# Patient Record
Sex: FEMALE | Race: WHITE | NOT HISPANIC OR LATINO | Employment: FULL TIME | URBAN - METROPOLITAN AREA
[De-identification: names, ages, dates, MRNs, and addresses within clinical notes are randomized per-mention and may not be internally consistent; named-entity substitution may affect disease eponyms.]

---

## 2024-06-17 ENCOUNTER — OFFICE VISIT (OUTPATIENT)
Dept: PODIATRY | Facility: CLINIC | Age: 64
End: 2024-06-17
Payer: MEDICARE

## 2024-06-17 ENCOUNTER — LAB VISIT (OUTPATIENT)
Dept: LAB | Facility: HOSPITAL | Age: 64
End: 2024-06-17
Attending: PODIATRIST
Payer: MEDICARE

## 2024-06-17 VITALS — HEIGHT: 65 IN | BODY MASS INDEX: 33.86 KG/M2 | WEIGHT: 203.25 LBS

## 2024-06-17 DIAGNOSIS — M10.9 GOUT INVOLVING TOE OF RIGHT FOOT, UNSPECIFIED CAUSE, UNSPECIFIED CHRONICITY: ICD-10-CM

## 2024-06-17 DIAGNOSIS — M10.9 GOUT INVOLVING TOE OF RIGHT FOOT, UNSPECIFIED CAUSE, UNSPECIFIED CHRONICITY: Primary | ICD-10-CM

## 2024-06-17 LAB — URATE SERPL-MCNC: 7.1 MG/DL (ref 2.4–5.7)

## 2024-06-17 PROCEDURE — 1160F RVW MEDS BY RX/DR IN RCRD: CPT | Mod: CPTII,S$GLB,, | Performed by: PODIATRIST

## 2024-06-17 PROCEDURE — 3008F BODY MASS INDEX DOCD: CPT | Mod: CPTII,S$GLB,, | Performed by: PODIATRIST

## 2024-06-17 PROCEDURE — 84550 ASSAY OF BLOOD/URIC ACID: CPT | Performed by: PODIATRIST

## 2024-06-17 PROCEDURE — 99203 OFFICE O/P NEW LOW 30 MIN: CPT | Mod: S$GLB,,, | Performed by: PODIATRIST

## 2024-06-17 PROCEDURE — 1159F MED LIST DOCD IN RCRD: CPT | Mod: CPTII,S$GLB,, | Performed by: PODIATRIST

## 2024-06-17 PROCEDURE — 3288F FALL RISK ASSESSMENT DOCD: CPT | Mod: CPTII,S$GLB,, | Performed by: PODIATRIST

## 2024-06-17 PROCEDURE — 36415 COLL VENOUS BLD VENIPUNCTURE: CPT | Performed by: PODIATRIST

## 2024-06-17 PROCEDURE — 99999 PR PBB SHADOW E&M-NEW PATIENT-LVL III: CPT | Mod: PBBFAC,,, | Performed by: PODIATRIST

## 2024-06-17 PROCEDURE — 1101F PT FALLS ASSESS-DOCD LE1/YR: CPT | Mod: CPTII,S$GLB,, | Performed by: PODIATRIST

## 2024-06-17 RX ORDER — COLCHICINE 0.6 MG/1
0.6 TABLET ORAL DAILY
Qty: 6 TABLET | Refills: 1 | Status: SHIPPED | OUTPATIENT
Start: 2024-06-17 | End: 2024-06-19 | Stop reason: SDUPTHER

## 2024-06-17 RX ORDER — LEVOTHYROXINE SODIUM 50 UG/1
50 TABLET ORAL
COMMUNITY
Start: 2024-03-05

## 2024-06-17 RX ORDER — ROSUVASTATIN CALCIUM 5 MG/1
TABLET, COATED ORAL
COMMUNITY
Start: 2024-06-13

## 2024-06-17 RX ORDER — INDOMETHACIN 75 MG/1
75 CAPSULE, EXTENDED RELEASE ORAL 2 TIMES DAILY WITH MEALS
Qty: 60 CAPSULE | Refills: 0 | Status: SHIPPED | OUTPATIENT
Start: 2024-06-17

## 2024-06-17 RX ORDER — ALLOPURINOL 300 MG/1
300 TABLET ORAL
COMMUNITY
Start: 2024-05-07

## 2024-06-17 RX ORDER — METFORMIN HYDROCHLORIDE 1000 MG/1
TABLET ORAL
COMMUNITY
Start: 2024-06-13

## 2024-06-17 RX ORDER — ZOLPIDEM TARTRATE 10 MG/1
10 TABLET ORAL DAILY PRN
COMMUNITY
Start: 2024-05-08

## 2024-06-17 NOTE — PROGRESS NOTES
"Subjective:     Patient ID: Ileana Coko is a 64 y.o. female.    Chief Complaint: Gout (Pt c/o right foot gout, pt c/o right ball of foot pain 5/10, pre-diabetic pt wears slippers, PCP Dr Lady Hook last seen 2024) and Foot Pain    HPI: This 64 year old female presents today complaing of painful swollen right  foot.  Patient states pain and swelling just started all of a sudden several days ago. Patient denies any injury to the foot. When asked were to indicate where the pain is, patient points to right elbow and right/left 1st MPJ. Patient states she is still taking Allopurinol as prescribed by her PCP but ran out of Colchicine. Patient has no other pedial complaints at this time.     There is no problem list on file for this patient.      Medication List with Changes/Refills   New Medications    COLCHICINE (COLCRYS) 0.6 MG TABLET    Take 1 tablet (0.6 mg total) by mouth once daily. Take one tablet daily. for 12 days    INDOMETHACIN (INDOCIN SR) 75 MG CPSR CR CAPSULE    Take 1 capsule (75 mg total) by mouth 2 (two) times daily with meals.   Current Medications    ALLOPURINOL (ZYLOPRIM) 300 MG TABLET    Take 300 mg by mouth.    LEVOTHYROXINE (SYNTHROID) 50 MCG TABLET    Take 50 mcg by mouth.    METFORMIN (GLUCOPHAGE) 1000 MG TABLET    Take by mouth.    ROSUVASTATIN (CRESTOR) 5 MG TABLET    Take by mouth.    ZOLPIDEM (AMBIEN) 10 MG TAB    Take 10 mg by mouth daily as needed.       Review of patient's allergies indicates:  No Known Allergies    Past Surgical History:   Procedure Laterality Date    BREAST SURGERY       SECTION         No family history on file.    Social History     Socioeconomic History    Marital status:    Tobacco Use    Smoking status: Never     Passive exposure: Never    Smokeless tobacco: Never   Substance and Sexual Activity    Alcohol use: Not Currently    Drug use: Never       Vitals:    24 1436   Weight: 92.2 kg (203 lb 4.2 oz)   Height: 5' 5" (1.651 m) "   PainSc:   5       Review of Systems   Constitutional:  Negative for chills and fever.   Respiratory:  Negative for shortness of breath.    Cardiovascular:  Negative for chest pain, palpitations, orthopnea, claudication and leg swelling.   Gastrointestinal:  Negative for diarrhea, nausea and vomiting.   Musculoskeletal:  Positive for joint pain (right 1st MPJ).   Skin:  Negative for rash.   Neurological:  Negative for dizziness, tingling, sensory change, focal weakness and weakness.   Psychiatric/Behavioral: Negative.           Objective:      PHYSICAL EXAM: Apperance: Alert and orient in no distress,well developed, and with good attention to grooming and body habits  Patient presents ambulating in flip flops  LOWER EXTREMITIES EXAM:   VASCULAR: Dorsalis pedis pulses 2/4 bilateral and Posterior Tibial pulses 2/4 bilateral. Capillary fill time <blanched bilateral. Skin temperature of the lower extremities is warm to warm, proximal to distal. Hair growth dim bilateral.  DERMATOLOGICAL: No skin rashes, subcutaneous nodules, lesions, or ulcers observed bilateral. (+) edema, (--) erythema, (+) increased temperature noted to right 1st MPJ. Webspaces 1,2,3,4 clean, dry and without evidence of break in skin integrity bilateral. Patient    NEUROLOGICAL: Light touch, sharp-dull, proprioception all present and equal bilaterally.  MUSCULOSKELETAL: Muscle strength is 5/5 for foot inverters, everters, plantarflexors, and dorsiflexors. Muscle tone is normal. Positive pain on palpation of right 1st MPJ. There is pain on palpation of the right 2nd intermetatarsal space with a (--) Dennise's click. Minimal tenderness to palpation of the adjacent metatarsal heads.        Assessment:       ICD-10-CM ICD-9-CM   1. Gout involving toe of right foot, unspecified cause, unspecified chronicity - Right Foot  M10.9 274.9       Plan:   Gout involving toe of right foot, unspecified cause, unspecified chronicity - Right Foot  -     Uric acid;  Future; Expected date: 06/17/2024  -     colchicine (COLCRYS) 0.6 mg tablet; Take 1 tablet (0.6 mg total) by mouth once daily. Take one tablet daily. for 12 days  Dispense: 6 tablet; Refill: 1  -     indomethacin (INDOCIN SR) 75 mg CpSR CR capsule; Take 1 capsule (75 mg total) by mouth 2 (two) times daily with meals.  Dispense: 60 capsule; Refill: 0    I counseled the patient on her conditions, regarding findings of my examination, my impressions, and usual treatment plan.   I explained to the patient that etiologies and treatment options for gout including rest, elevation, diet control, NSAID's, long term gout medication, and/or injection therapy.  Prescription written for Colchicine 0.6mg to be taken as prescribed.  Prescribed Indomethacin 75 mg B.I.D. to be taken as needed for inflammation. Patient advised on the possible elevation of blood pressure or heart effects and caution to take pills as needed and to discontinue use if symptoms arise, patient agreed.    Ordered uric acid, crp, and esr testing to be completed today.    Patient to return in 6 weeks or sooner if needed.        Pillo Seth DPM  [facility]

## 2024-06-19 ENCOUNTER — TELEPHONE (OUTPATIENT)
Dept: PODIATRY | Facility: CLINIC | Age: 64
End: 2024-06-19
Payer: MEDICARE

## 2024-06-19 DIAGNOSIS — M10.9 GOUT INVOLVING TOE OF RIGHT FOOT, UNSPECIFIED CAUSE, UNSPECIFIED CHRONICITY: ICD-10-CM

## 2024-06-19 RX ORDER — COLCHICINE 0.6 MG/1
0.6 TABLET ORAL DAILY
Qty: 6 TABLET | Refills: 1 | Status: SHIPPED | OUTPATIENT
Start: 2024-06-19 | End: 2024-07-01

## 2024-06-19 NOTE — TELEPHONE ENCOUNTER
Spoke with Mrs. Cook about her labs, and medication that she needs to keep taking.               ----- Message from Felisa Evans sent at 6/19/2024  9:46 AM CDT -----  Contact: Ileana  .Patient is calling to speak with the nurse regarding questions and concerns . Reports wanting to discuss the 3 medications and results  . Please give patient a call back at   [phone]

## 2024-07-31 ENCOUNTER — OFFICE VISIT (OUTPATIENT)
Dept: PODIATRY | Facility: CLINIC | Age: 64
End: 2024-07-31
Payer: MEDICARE

## 2024-07-31 VITALS — BODY MASS INDEX: 33.86 KG/M2 | HEIGHT: 65 IN | WEIGHT: 203.25 LBS

## 2024-07-31 DIAGNOSIS — M10.9 GOUT INVOLVING TOE OF RIGHT FOOT, UNSPECIFIED CAUSE, UNSPECIFIED CHRONICITY: ICD-10-CM

## 2024-07-31 PROCEDURE — 3288F FALL RISK ASSESSMENT DOCD: CPT | Mod: CPTII,S$GLB,, | Performed by: PODIATRIST

## 2024-07-31 PROCEDURE — 99999 PR PBB SHADOW E&M-EST. PATIENT-LVL III: CPT | Mod: PBBFAC,,, | Performed by: PODIATRIST

## 2024-07-31 PROCEDURE — 1159F MED LIST DOCD IN RCRD: CPT | Mod: CPTII,S$GLB,, | Performed by: PODIATRIST

## 2024-07-31 PROCEDURE — 1160F RVW MEDS BY RX/DR IN RCRD: CPT | Mod: CPTII,S$GLB,, | Performed by: PODIATRIST

## 2024-07-31 PROCEDURE — 99213 OFFICE O/P EST LOW 20 MIN: CPT | Mod: S$GLB,,, | Performed by: PODIATRIST

## 2024-07-31 PROCEDURE — 3008F BODY MASS INDEX DOCD: CPT | Mod: CPTII,S$GLB,, | Performed by: PODIATRIST

## 2024-07-31 PROCEDURE — 1101F PT FALLS ASSESS-DOCD LE1/YR: CPT | Mod: CPTII,S$GLB,, | Performed by: PODIATRIST

## 2024-07-31 RX ORDER — COLCHICINE 0.6 MG/1
0.6 TABLET ORAL DAILY
Qty: 12 TABLET | Refills: 3 | Status: SHIPPED | OUTPATIENT
Start: 2024-07-31

## 2024-07-31 RX ORDER — ALLOPURINOL 300 MG/1
300 TABLET ORAL DAILY
Qty: 30 TABLET | Refills: 3 | Status: SHIPPED | OUTPATIENT
Start: 2024-07-31

## 2024-07-31 NOTE — PROGRESS NOTES
Subjective:     Patient ID: Ileana Cook is a 64 y.o. female.    Chief Complaint: Follow-up (Right foot gout f/u, pt c/o 0/10 pain, non-diabetic pt wears sandals,  PCP Dr Lady Hook last seen ) and Gout    Ileana is a 64 y.o. female who presents to the podiatry clinic for follow up of right foot pain. Patient states pain is much better. Patient states she took the medications as prescribed. Patient has no other pedal complaints at this time.     There is no problem list on file for this patient.      Medication List with Changes/Refills   New Medications    ALLOPURINOL (ZYLOPRIM) 300 MG TABLET    Take 1 tablet (300 mg total) by mouth once daily.   Current Medications    ALLOPURINOL (ZYLOPRIM) 300 MG TABLET    Take 300 mg by mouth.    INDOMETHACIN (INDOCIN SR) 75 MG CPSR CR CAPSULE    Take 1 capsule (75 mg total) by mouth 2 (two) times daily with meals.    LEVOTHYROXINE (SYNTHROID) 50 MCG TABLET    Take 50 mcg by mouth.    METFORMIN (GLUCOPHAGE) 1000 MG TABLET    Take by mouth.    ROSUVASTATIN (CRESTOR) 5 MG TABLET    Take by mouth.    ZOLPIDEM (AMBIEN) 10 MG TAB    Take 10 mg by mouth daily as needed.   Changed and/or Refilled Medications    Modified Medication Previous Medication    COLCHICINE (COLCRYS) 0.6 MG TABLET colchicine (COLCRYS) 0.6 mg tablet       Take 1 tablet (0.6 mg total) by mouth once daily. Take one tablet daily.    Take 1 tablet (0.6 mg total) by mouth once daily. Take one tablet daily. for 12 days       Review of patient's allergies indicates:  No Known Allergies    Past Surgical History:   Procedure Laterality Date    BREAST SURGERY       SECTION         No family history on file.    Social History     Socioeconomic History    Marital status:    Tobacco Use    Smoking status: Never     Passive exposure: Never    Smokeless tobacco: Never   Substance and Sexual Activity    Alcohol use: Not Currently    Drug use: Never       Vitals:    24 1037   Weight: 92.2 kg  "(203 lb 4.2 oz)   Height: 5' 5" (1.651 m)   PainSc: 0-No pain       Review of Systems   Constitutional:  Negative for chills and fever.   Respiratory:  Negative for shortness of breath.    Cardiovascular:  Negative for chest pain, palpitations, orthopnea, claudication and leg swelling.   Gastrointestinal:  Negative for diarrhea, nausea and vomiting.   Musculoskeletal:  Negative for joint pain.   Skin:  Negative for rash.   Neurological:  Negative for dizziness, tingling, sensory change, focal weakness and weakness.   Psychiatric/Behavioral: Negative.             Objective:      PHYSICAL EXAM: Apperance: Alert and orient in no distress,well developed, and with good attention to grooming and body habits  Patient presents ambulating in flip flops  LOWER EXTREMITIES EXAM:   VASCULAR: Dorsalis pedis pulses 2/4 bilateral and Posterior Tibial pulses 2/4 bilateral. Capillary fill time <blanched bilateral. Skin temperature of the lower extremities is warm to warm, proximal to distal. Hair growth dim bilateral.  DERMATOLOGICAL: No skin rashes, subcutaneous nodules, lesions, or ulcers observed bilateral. (-) edema, (--) erythema, (-) increased temperature noted to right 1st MPJ. Webspaces 1,2,3,4 clean, dry and without evidence of break in skin integrity bilateral. Patient    NEUROLOGICAL: Light touch, sharp-dull, proprioception all present and equal bilaterally.  MUSCULOSKELETAL: Muscle strength is 5/5 for foot inverters, everters, plantarflexors, and dorsiflexors. Muscle tone is normal. Negative pain on palpation of right 1st MPJ. There is no pain on palpation of the right 2nd intermetatarsal space with a (--) Dennise's click. Minimal tenderness to palpation of the adjacent metatarsal heads.    TEST RESULTS: Uric acid results reveals 7.1          Assessment:       ICD-10-CM ICD-9-CM   1. Gout involving toe of right foot, unspecified cause, unspecified chronicity - Right Foot  M10.9 274.9       Plan:   Gout involving toe of " right foot, unspecified cause, unspecified chronicity - Right Foot  -     colchicine (COLCRYS) 0.6 mg tablet; Take 1 tablet (0.6 mg total) by mouth once daily. Take one tablet daily.  Dispense: 12 tablet; Refill: 3  -     allopurinoL (ZYLOPRIM) 300 MG tablet; Take 1 tablet (300 mg total) by mouth once daily.  Dispense: 30 tablet; Refill: 3  -     Sedimentation rate; Future; Expected date: 07/31/2024  -     C-Reactive Protein; Future; Expected date: 07/31/2024  -     Uric Acid; Future; Expected date: 07/31/2024      I counseled the patient on her conditions, regarding findings of my examination, my impressions, and usual treatment plan.   I explained to the patient that etiologies and treatment options for gout including rest, elevation, diet control, NSAID's, long term gout medication, and/or injection therapy.    Refill written for Colchicine 0.6mg to be taken as prescribed.  Refilled written for Allopurinol 100mg to be taken as prescribed.   Ordered uric acid, crp, and esr testing to be completed in October  Patient to return as needed.          Pillo Seth DPM  [facility]

## 2024-10-08 PROBLEM — E78.5 HYPERLIPIDEMIA LDL GOAL <70: Status: ACTIVE | Noted: 2024-07-01

## 2024-10-08 PROBLEM — Z78.9 STATIN INTOLERANCE: Status: ACTIVE | Noted: 2024-07-01

## 2024-10-08 PROBLEM — N18.30 CKD (CHRONIC KIDNEY DISEASE) STAGE 3, GFR 30-59 ML/MIN: Status: ACTIVE | Noted: 2024-10-08

## 2024-10-08 PROBLEM — R93.1 AGATSTON CORONARY ARTERY CALCIUM SCORE LESS THAN 100: Status: ACTIVE | Noted: 2024-08-12

## 2024-10-08 PROBLEM — I25.10 ATHEROSCLEROSIS OF NATIVE CORONARY ARTERY OF NATIVE HEART WITHOUT ANGINA PECTORIS: Status: ACTIVE | Noted: 2024-08-12

## 2024-10-09 ENCOUNTER — OFFICE VISIT (OUTPATIENT)
Dept: PRIMARY CARE CLINIC | Facility: CLINIC | Age: 64
End: 2024-10-09
Payer: MEDICARE

## 2024-10-09 VITALS
SYSTOLIC BLOOD PRESSURE: 118 MMHG | OXYGEN SATURATION: 98 % | DIASTOLIC BLOOD PRESSURE: 78 MMHG | WEIGHT: 184.5 LBS | BODY MASS INDEX: 30.74 KG/M2 | HEART RATE: 79 BPM | RESPIRATION RATE: 20 BRPM | HEIGHT: 65 IN | TEMPERATURE: 99 F

## 2024-10-09 DIAGNOSIS — M10.9 GOUTY ARTHROPATHY: ICD-10-CM

## 2024-10-09 DIAGNOSIS — M10.9 GOUT INVOLVING TOE OF RIGHT FOOT, UNSPECIFIED CAUSE, UNSPECIFIED CHRONICITY: ICD-10-CM

## 2024-10-09 DIAGNOSIS — M89.9 BONE DISORDER: ICD-10-CM

## 2024-10-09 DIAGNOSIS — Z78.0 ASYMPTOMATIC AGE-RELATED POSTMENOPAUSAL STATE: ICD-10-CM

## 2024-10-09 DIAGNOSIS — H90.6 MIXED CONDUCTIVE AND SENSORINEURAL HEARING LOSS OF BOTH EARS: ICD-10-CM

## 2024-10-09 DIAGNOSIS — I25.10 ATHEROSCLEROSIS OF NATIVE CORONARY ARTERY OF NATIVE HEART WITHOUT ANGINA PECTORIS: ICD-10-CM

## 2024-10-09 DIAGNOSIS — R19.7 DIARRHEA OF PRESUMED INFECTIOUS ORIGIN: ICD-10-CM

## 2024-10-09 DIAGNOSIS — N18.31 STAGE 3A CHRONIC KIDNEY DISEASE: ICD-10-CM

## 2024-10-09 DIAGNOSIS — H90.0 CONDUCTIVE HEARING LOSS, BILATERAL: ICD-10-CM

## 2024-10-09 DIAGNOSIS — Z78.9 STATIN INTOLERANCE: ICD-10-CM

## 2024-10-09 DIAGNOSIS — R93.1 AGATSTON CORONARY ARTERY CALCIUM SCORE LESS THAN 100: ICD-10-CM

## 2024-10-09 DIAGNOSIS — E78.5 HYPERLIPIDEMIA LDL GOAL <70: Primary | ICD-10-CM

## 2024-10-09 DIAGNOSIS — R73.03 PREDIABETES: ICD-10-CM

## 2024-10-09 PROBLEM — E66.09 OBESITY DUE TO EXCESS CALORIES WITH SERIOUS COMORBIDITY: Status: ACTIVE | Noted: 2024-10-09

## 2024-10-09 PROCEDURE — 99999 PR PBB SHADOW E&M-EST. PATIENT-LVL IV: CPT | Mod: PBBFAC,,,

## 2024-10-09 RX ORDER — ZOLPIDEM TARTRATE 10 MG/1
10 TABLET ORAL NIGHTLY PRN
Qty: 90 TABLET | Refills: 0 | Status: SHIPPED | OUTPATIENT
Start: 2024-10-09 | End: 2025-04-07

## 2024-10-09 RX ORDER — INCLISIRAN 284 MG/1.5ML
284 INJECTION, SOLUTION SUBCUTANEOUS
COMMUNITY
Start: 2024-09-15

## 2024-10-09 RX ORDER — COLCHICINE 0.6 MG/1
0.6 TABLET ORAL 2 TIMES DAILY PRN
COMMUNITY
Start: 2024-05-07

## 2024-10-09 NOTE — ASSESSMENT & PLAN NOTE
Patient states that she is having difficulty with hearing , she has in the volume of the television to here well.  In addition if there is noise around she has difficulty with hearing.    Patient is referred to audiology for further evaluation.

## 2024-10-09 NOTE — ASSESSMENT & PLAN NOTE
Advised her to take colchicine 0.6 mg 1 tab twice a day for 6 or 7 days only on as-needed basis on gout exacerbation

## 2024-10-09 NOTE — PROGRESS NOTES
Primary Care Provider Appointment   [facility], [place]    Patient ID: Ileana Cook is a 64 y.o. female.    Patient Care Team:  Hector Wiley MD as PCP - General (Interventional Cardiology)      ASSESSMENT/PLAN by Problem List:  1. Hyperlipidemia LDL goal <70  Overview:  Component Value Ref Range Test Method Analysis Time Performed At [facility]   Cholesterol, Total 217 (H) 100 - 199 mg/dL     [facility]     Triglyceride 217 (H) 0 - 149 mg/dL     [facility]     HDL-C 40 >39 mg/dL     [facility]     Non HDL Cholesterol 177 (H) 0 - 129 mg/dL     [facility]     LDL-C (NIH Calc) 138 (H) 0 - 99 mg/dL     [facility]     Comment:                           Optimal               <  100                           Above optimal     100 -  129                           Borderline        130 -  159                           High              160 -  189                           Very high             >  189     Apolipoprotein B 130 (H) <90 mg/dL     [facility]     Comment:                          Desirable               < 90                          Borderline High     90 -  99                          High               100 - 130                          Very High               >130      --------------------------------------------------           ASCVD RISK              THERAPEUTIC TARGET            CATEGORY                  APO B (mg/dL)         Very High Risk        <80 (if extreme risk <70)         High Risk             <90         Moderate Risk         <90       Lab Results - (ABNORMAL) Lipid Panel With Apolipoprotein B (ApoB) (07/01/2024 2:36 PM CDT)      Received Time              Assessment & Plan:  Recheck lipid panel in 6 months.  Possibly her cardiologist may do lipid panel prior to 6 months    Orders:  -     TSH; Future; Expected date: 10/09/2024    2. Stage 3a chronic kidney disease  Overview:    Ref Range & Units 3 mo ago    Glucose Screen 70 - 99 mg/dL 85   Blood  Urea Nitrogen 8 - 27 mg/dL 23   Creatinine 0.57 - 1.00 mg/dL 1.12 High    EGFR >59 mL/min/1.73 55 Low    BUN/Creatinine Ratio 12 - 28 21   Sodium 134 - 144 mmol/L 138   Potassium 3.5 - 5.2 mmol/L 4.9   Chloride 96 - 106 mmol/L 100   Carbon Dioxide 20 - 29 mmol/L 25   Calcium 8.7 - 10.3 mg/dL 10.5 High    Resulting Agency  [facility]   Narrative  Performed by [facility]  Specimen Collected: 07/01/24 14:36       Assessment & Plan:  Repeating the CMP to check on the status of creatinine and calcium level.    Orders:  -     Comprehensive Metabolic Panel; Future; Expected date: 10/09/2024  -     Urinalysis, Reflex to Urine Culture Urine, Clean Catch  -     CULTURE, URINE; Future; Expected date: 10/09/2024    3. Conductive hearing loss, bilateral  Assessment & Plan:  Patient states that she is having difficulty with hearing , she has in the volume of the television to here well.  In addition if there is noise around she has difficulty with hearing.    Patient is referred to audiology for further evaluation.      4. Diarrhea of presumed infectious origin  Overview:  Diarrhea ongoing for couple of months, patient has taking Mounjaro 15 mg, not sure that that is causing the issue.    Assessment & Plan:  Stool study to be done culture and ova cysts and parasites    Orders:  -     CBC Without Differential; Future; Expected date: 10/09/2024  -     CULTURE, STOOL; Future; Expected date: 10/09/2024  -     Stool Exam-Ova,Cysts,Parasites; Future; Expected date: 10/09/2024    5. Prediabetes  Overview:  Patient has been on Glucophage for many years, currently on metformin 2 tablets daily    Assessment & Plan:  Order for hemoglobin A1c to check the status at present given to patient    Orders:  -     Hemoglobin A1C; Future; Expected date: 10/09/2024    6. Mixed conductive and sensorineural hearing loss of both ears  -     Ambulatory referral/consult to Audiology; Future; Expected date: 10/16/2024    7. Bone disorder  -     DXA Bone  Density Axial Skeleton 1 or more sites; Future; Expected date: 10/09/2024    8. Asymptomatic age-related postmenopausal state  Overview:  Patient has not had any bone density test in the past, order done today    Orders:  -     DXA Bone Density Axial Skeleton 1 or more sites; Future; Expected date: 10/09/2024    9. Agatston coronary artery calcium score less than 100  Overview:      Calcium Analysis:  Calcium Scores    Coronary Artery Score  Left Main (LM) 0  Left Anterior Descending (LAD) 50.4  Left Circumflex (LCX) 0  Right Coronary Artery (RCA) 0  Total Agatston Score 50.4        [facility]    Lucien Bonilla MD  Exam End: 07/17/24 07:57 Last Resulted: 07/17/24 13:04           Assessment & Plan:  Patient has taken her 1st dose of leqviq few weeks ago, due for the 2nd shot in December.      10. Atherosclerosis of native coronary artery of native heart without angina pectoris  Overview:  Per cardiology consult  1.  Mild aortic atherosclerosis  2.  Codominant coronary circulation with mild to moderate left anterior  descending and ostial right coronary artery stenoses as detailed.  CAD  RADS 3/ P1          11. Statin intolerance  Assessment & Plan:  Currently patient is on injectable alternative lipid medication Leqvio      Other orders  -     Discontinue: tirzepatide 15 mg/0.5 mL PnIj; Inject 15 mg into the skin every 7 days.  Dispense: 4 Pen; Refill: 0  -     tirzepatide 15 mg/0.5 mL PnIj; Inject 15 mg into the skin every 7 days.  Dispense: 4 Pen; Refill: 5             Follow up in about 3 months (around 1/9/2025).     Health Maintenance         Date Due Completion Date    Hepatitis C Screening Never done ---    Annual UACr Never done ---    HIV Screening Never done ---    TETANUS VACCINE Never done ---    DEXA Scan Never done ---    Shingles Vaccine (1 of 2) Never done ---    RSV Vaccine (Age 60+ and Pregnant patients) (1 - Risk 60-74 years 1-dose series) Never done ---    Hemoglobin A1c  (Prediabetes) 07/28/2021 7/28/2020    Pneumococcal Vaccines (Age 65+) (1 of 1 - PCV) Never done ---    Influenza Vaccine (1) 09/01/2024 1/31/2018    COVID-19 Vaccine (1 - 2024-25 season) Never done ---    Mammogram 10/24/2024 10/24/2023    Lipid Panel 07/01/2029 7/1/2024    Colorectal Cancer Screening 02/28/2033 2/28/2023            Worry score 2    Advance Care Planning   Patient is given handouts on living will an advanced care planning Date: 10/09/2024             Subjective:     Chief Complaint   Patient presents with    Establish Care     I have reviewed the information entered by the ancillary staff regarding the chief complaint as well as the related history.    HPI   History of Present Illness    CHIEF COMPLAINT:  Patient is here for the follow-up.  She has multiple medical problems including gout, prediabetes, excess weight, hyperlipidemia.   MEDICATIONS:  She is currently taking colchicine 0.66 mg twice a day for acute exacerbations, and when they occur patient is under control she takes Allopurinol daily for gout prevention.  She can not tolerate statin currently she is on injectable medication, given by her cardiologist.  Due for the 2nd injection in 2 months   She takes Metformin and Mounjaro for diabetes management and weight loss. She uses Ambien for sleep, expressing reluctance to add additional medications for anxiety to potentially reduce Ambien usage.    GOUT:  She reports occasional gout attacks. She has been prescribed Indomethacin to be taken twice daily during flares, but does not regularly take the medication.    SLEEP ISSUES:  She uses Ambien for sleep, taking it every other day or sometimes just half a dose. 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 She notes that Ambien works well when taken and appreciates having it available when needed.    GASTROINTESTINAL  "ISSUES:  She reports recent episodes of intermittent diarrhea, describing it as "a little loose." She is uncertain if this is medication-related. She denies abdominal pain or leg swelling but mentions experiencing some nausea with the medication and a few instances of vomiting. She notes feeling better going to the bathroom, as she was previously constipated.    HEARING AND VISION:  Her  reports potential hearing loss, noting increased television volume. She is unsure about experiencing hearing issues herself but agrees to have her hearing checked. She reports regular visits to her eye doctor, approximately twice a year, with her last exam about six months ago. Her vision is fine.    WEIGHT MANAGEMENT:  She is seeing a nurse practitioner for weight loss management and is currently taking Mounjaro as part of her weight loss regimen.  She gets her Mounjaro prescription from a pharmacy in [place]    MEDICAL HISTORY:  She reports a history of E. coli infection in her urine. She expresses concern about experiencing diarrhea, associating it with her previous E. coli infection. She has a history of constipation. Regarding preventive care, she had a previous colonoscopy at [facility], though she is unsure of the exact timing or recommended follow-up interval.    PREVENTIVE CARE:  She had a mammogram last year and is due for another one. She confirms being up-to-date with dental checkups, with her next appointment scheduled for the following week.    ALLERGIES:  She reports a previous adverse reaction to the flu vaccine, stating she became severely ill after receiving it. As a result, she expresses reluctance to receive any vaccinations.          Patient is a/an 64 y.o. female    For complete problem list, past medical history, surgical history, social history, etc., see appropriate section in the electronic medical record    Review of Systems   Constitutional: Negative fever/chills/wt loss     HENT: Negative for " "sore throat/lymph nodes / thyroid issues.     Eyes: Negative eye pain/blurred vision    Respiratory: Negative CP/SOB/wheezing   Cardiovascular:  Negative CP/palp/orthopnea/LE   Gastrointestinal:  Negative abd.pain / diar/cons/bloody stool/acid reflux  Genitourinary:  Negative urinary symptoms  Musculoskeletal: Negative myalgia, joint pains, gait issues    Skin: Negative rashes,I tching and easy bruises.    Neurological:  Negative for dizziness, sensory change, muscle weakness, seizures, LOC, & H/A.   Endo/Heme/Allergies: Negative allergic symptoms    Psychiatric/Behavioral: Denies memory loss/ suicidal ideas    ROS     Recent Fall:  []Yes  [x]No  Activity:  []Vigorous [x]Moderate []Sedentary  Appetite:  [x]Good  []Fair  []Poor  Mood: [x]Stable []Anxious []Depressed   Insomnia: [x]Yes  []No      Objective             Vitals:    10/09/24 1114   BP: 118/78   BP Location: Left arm   Patient Position: Sitting   Pulse: 79   Resp: 20   Temp: 98.9 °F (37.2 °C)   TempSrc: Oral   SpO2: 98%   Weight: 83.7 kg (184 lb 8.4 oz)   Height: 5' 5" (1.651 m)     Weight: 83.7 kg (184 lb 8.4 oz)  BP Readings from Last 3 Encounters:   10/09/24 118/78       Wt Readings from Last 3 Encounters:   10/09/24 83.7 kg (184 lb 8.4 oz)   07/31/24 92.2 kg (203 lb 4.2 oz)   06/17/24 92.2 kg (203 lb 4.2 oz)     Body mass index is 30.71 kg/m².    Physical Exam  Constitutional:       Appearance: She is obese.   HENT:      Head: Normocephalic and atraumatic.      Nose: Nose normal.   Eyes:      Extraocular Movements: Extraocular movements intact.      Conjunctiva/sclera: Conjunctivae normal.      Pupils: Pupils are equal, round, and reactive to light.   Cardiovascular:      Rate and Rhythm: Normal rate and regular rhythm.      Pulses: Normal pulses.   Pulmonary:      Effort: Pulmonary effort is normal.   Abdominal:      Palpations: Abdomen is soft.      Tenderness: There is abdominal tenderness. There is no right CVA tenderness or guarding. "   Musculoskeletal:         General: Normal range of motion.      Cervical back: Normal range of motion.   Skin:     General: Skin is warm.   Neurological:      General: No focal deficit present.      Mental Status: She is oriented to person, place, and time.   Psychiatric:         Mood and Affect: Mood normal.         Thought Content: Thought content normal.         Judgment: Judgment normal.          This note was generated with the assistance of ambient listening technology. Verbal consent was obtained by the patient and accompanying visitor(s) for the recording of patient appointment to facilitate this note. I attest to having reviewed and edited the generated note for accuracy, though some syntax or spelling errors may persist. Please contact the author of this note for any clarification.     THIS DOCUMENT WAS MADE IN PART WITH VOICE RECOGNITION SOFTWARE.  OCCASIONALLY THIS SOFTWARE WILL MISINTERPRET WORDS OR PHRASES.

## 2024-10-11 ENCOUNTER — LAB VISIT (OUTPATIENT)
Dept: LAB | Facility: HOSPITAL | Age: 64
End: 2024-10-11
Attending: FAMILY MEDICINE
Payer: MEDICARE

## 2024-10-11 DIAGNOSIS — R19.7 DIARRHEA OF PRESUMED INFECTIOUS ORIGIN: ICD-10-CM

## 2024-10-11 PROCEDURE — 87046 STOOL CULTR AEROBIC BACT EA: CPT | Performed by: FAMILY MEDICINE

## 2024-10-11 PROCEDURE — 87045 FECES CULTURE AEROBIC BACT: CPT | Performed by: FAMILY MEDICINE

## 2024-10-11 PROCEDURE — 87209 SMEAR COMPLEX STAIN: CPT | Performed by: FAMILY MEDICINE

## 2024-10-11 PROCEDURE — 87449 NOS EACH ORGANISM AG IA: CPT | Performed by: FAMILY MEDICINE

## 2024-10-11 PROCEDURE — 87427 SHIGA-LIKE TOXIN AG IA: CPT | Performed by: FAMILY MEDICINE

## 2024-10-12 ENCOUNTER — PATIENT MESSAGE (OUTPATIENT)
Dept: PRIMARY CARE CLINIC | Facility: CLINIC | Age: 64
End: 2024-10-12
Payer: MEDICARE

## 2024-10-12 LAB
E COLI SXT1 STL QL IA: NEGATIVE
E COLI SXT2 STL QL IA: NEGATIVE

## 2024-10-13 ENCOUNTER — TELEPHONE (OUTPATIENT)
Dept: PRIMARY CARE CLINIC | Facility: CLINIC | Age: 64
End: 2024-10-13
Payer: MEDICARE

## 2024-10-14 ENCOUNTER — PATIENT MESSAGE (OUTPATIENT)
Dept: PRIMARY CARE CLINIC | Facility: CLINIC | Age: 64
End: 2024-10-14
Payer: MEDICARE

## 2024-10-14 LAB — BACTERIA STL CULT: NORMAL

## 2024-10-14 RX ORDER — SULFAMETHOXAZOLE AND TRIMETHOPRIM 800; 160 MG/1; MG/1
1 TABLET ORAL 2 TIMES DAILY
Qty: 6 TABLET | Refills: 0 | Status: SHIPPED | OUTPATIENT
Start: 2024-10-14 | End: 2024-10-17

## 2024-10-14 NOTE — TELEPHONE ENCOUNTER
During her last visit on 10/9/24, patient verbally expressed that she does not want to take any immuniztions- Flu, covid, RSV and Pneumonia etc. Will discuss further during future appts-

## 2024-10-18 LAB — O+P STL MICRO: NORMAL

## 2024-10-29 ENCOUNTER — APPOINTMENT (OUTPATIENT)
Dept: RADIOLOGY | Facility: HOSPITAL | Age: 64
End: 2024-10-29
Attending: FAMILY MEDICINE
Payer: MEDICARE

## 2024-10-29 ENCOUNTER — CLINICAL SUPPORT (OUTPATIENT)
Dept: AUDIOLOGY | Facility: CLINIC | Age: 64
End: 2024-10-29
Payer: MEDICARE

## 2024-10-29 ENCOUNTER — PATIENT MESSAGE (OUTPATIENT)
Dept: PRIMARY CARE CLINIC | Facility: CLINIC | Age: 64
End: 2024-10-29
Payer: MEDICARE

## 2024-10-29 DIAGNOSIS — H90.3 SENSORINEURAL HEARING LOSS, BILATERAL: Primary | ICD-10-CM

## 2024-10-29 DIAGNOSIS — H93.13 TINNITUS, BILATERAL: ICD-10-CM

## 2024-10-29 DIAGNOSIS — H90.6 MIXED CONDUCTIVE AND SENSORINEURAL HEARING LOSS OF BOTH EARS: ICD-10-CM

## 2024-10-29 PROCEDURE — 92567 TYMPANOMETRY: CPT | Mod: S$GLB,,, | Performed by: AUDIOLOGIST-HEARING AID FITTER

## 2024-10-29 PROCEDURE — 77080 DXA BONE DENSITY AXIAL: CPT | Mod: TC

## 2024-10-29 PROCEDURE — 99999 PR PBB SHADOW E&M-EST. PATIENT-LVL I: CPT | Mod: PBBFAC,,, | Performed by: AUDIOLOGIST-HEARING AID FITTER

## 2024-10-29 PROCEDURE — 92557 COMPREHENSIVE HEARING TEST: CPT | Mod: S$GLB,,, | Performed by: AUDIOLOGIST-HEARING AID FITTER

## 2024-10-29 PROCEDURE — 77080 DXA BONE DENSITY AXIAL: CPT | Mod: 26,,, | Performed by: RADIOLOGY

## 2024-11-26 ENCOUNTER — TELEPHONE (OUTPATIENT)
Dept: PRIMARY CARE CLINIC | Facility: CLINIC | Age: 64
End: 2024-11-26
Payer: MEDICARE

## 2024-11-26 RX ORDER — LEVOTHYROXINE SODIUM 50 UG/1
50 TABLET ORAL
Qty: 90 TABLET | Refills: 3 | Status: SHIPPED | OUTPATIENT
Start: 2024-11-26 | End: 2025-11-21

## 2024-12-09 DIAGNOSIS — F51.03 PARADOXICAL INSOMNIA: Primary | ICD-10-CM

## 2024-12-09 RX ORDER — ZOLPIDEM TARTRATE 10 MG/1
10 TABLET ORAL NIGHTLY PRN
Qty: 90 TABLET | Refills: 0 | Status: SHIPPED | OUTPATIENT
Start: 2024-12-09 | End: 2025-06-07

## 2024-12-09 RX ORDER — METFORMIN HYDROCHLORIDE 1000 MG/1
1000 TABLET ORAL 2 TIMES DAILY WITH MEALS
Qty: 180 TABLET | Refills: 1 | Status: SHIPPED | OUTPATIENT
Start: 2024-12-09

## 2025-01-03 DIAGNOSIS — M10.9 GOUT INVOLVING TOE OF RIGHT FOOT, UNSPECIFIED CAUSE, UNSPECIFIED CHRONICITY: ICD-10-CM

## 2025-01-03 RX ORDER — ALLOPURINOL 300 MG/1
300 TABLET ORAL
Qty: 30 TABLET | Refills: 0 | Status: SHIPPED | OUTPATIENT
Start: 2025-01-03

## 2025-01-08 ENCOUNTER — OFFICE VISIT (OUTPATIENT)
Dept: PRIMARY CARE CLINIC | Facility: CLINIC | Age: 65
End: 2025-01-08
Payer: MEDICARE

## 2025-01-08 VITALS
DIASTOLIC BLOOD PRESSURE: 62 MMHG | SYSTOLIC BLOOD PRESSURE: 120 MMHG | TEMPERATURE: 97 F | WEIGHT: 172.38 LBS | BODY MASS INDEX: 28.72 KG/M2 | HEART RATE: 79 BPM | HEIGHT: 65 IN | OXYGEN SATURATION: 98 %

## 2025-01-08 DIAGNOSIS — R39.9 URINARY SYMPTOM OR SIGN: ICD-10-CM

## 2025-01-08 DIAGNOSIS — Z71.85 IMMUNIZATION COUNSELING: ICD-10-CM

## 2025-01-08 DIAGNOSIS — M10.9 GOUT INVOLVING TOE OF RIGHT FOOT, UNSPECIFIED CAUSE, UNSPECIFIED CHRONICITY: ICD-10-CM

## 2025-01-08 DIAGNOSIS — E78.2 MIXED HYPERLIPIDEMIA: ICD-10-CM

## 2025-01-08 DIAGNOSIS — E53.8 B12 DEFICIENCY: ICD-10-CM

## 2025-01-08 DIAGNOSIS — E03.9 ACQUIRED HYPOTHYROIDISM: ICD-10-CM

## 2025-01-08 DIAGNOSIS — Z78.9 STATIN INTOLERANCE: ICD-10-CM

## 2025-01-08 DIAGNOSIS — N18.31 STAGE 3A CHRONIC KIDNEY DISEASE: ICD-10-CM

## 2025-01-08 DIAGNOSIS — M10.9 GOUTY ARTHROPATHY: Primary | ICD-10-CM

## 2025-01-08 DIAGNOSIS — R73.03 PREDIABETES: ICD-10-CM

## 2025-01-08 DIAGNOSIS — Z11.59 NEED FOR HEPATITIS C SCREENING TEST: ICD-10-CM

## 2025-01-08 PROBLEM — R19.7 DIARRHEA OF PRESUMED INFECTIOUS ORIGIN: Status: RESOLVED | Noted: 2024-10-09 | Resolved: 2025-01-08

## 2025-01-08 LAB
BILIRUB SERPL-MCNC: NORMAL MG/DL
BLOOD URINE, POC: NORMAL
CHOLEST SERPL-MCNC: 192 MG/DL (ref 120–199)
CHOLEST/HDLC SERPL: 5.3 {RATIO} (ref 2–5)
CLARITY, POC UA: CLEAR
COLOR, POC UA: YELLOW
CRP SERPL-MCNC: 6.5 MG/L (ref 0–8.2)
ERYTHROCYTE [SEDIMENTATION RATE] IN BLOOD BY PHOTOMETRIC METHOD: 22 MM/HR (ref 0–36)
GLUCOSE UR QL STRIP: NORMAL
HCV AB SERPL QL IA: NORMAL
HDLC SERPL-MCNC: 36 MG/DL (ref 40–75)
HDLC SERPL: 18.8 % (ref 20–50)
KETONES UR QL STRIP: NORMAL
LDLC SERPL CALC-MCNC: 123.2 MG/DL (ref 63–159)
LEUKOCYTE ESTERASE URINE, POC: NORMAL
NITRITE, POC UA: NORMAL
NONHDLC SERPL-MCNC: 156 MG/DL
PH, POC UA: 5
PROTEIN, POC: NORMAL
SPECIFIC GRAVITY, POC UA: 1.01
TRIGL SERPL-MCNC: 164 MG/DL (ref 30–150)
TSH SERPL DL<=0.005 MIU/L-ACNC: 1.36 UIU/ML (ref 0.4–4)
URATE SERPL-MCNC: 7.5 MG/DL (ref 2.4–5.7)
UROBILINOGEN, POC UA: 0.2
VIT B12 SERPL-MCNC: 589 PG/ML (ref 210–950)

## 2025-01-08 PROCEDURE — 1101F PT FALLS ASSESS-DOCD LE1/YR: CPT | Mod: CPTII,S$GLB,, | Performed by: FAMILY MEDICINE

## 2025-01-08 PROCEDURE — 1126F AMNT PAIN NOTED NONE PRSNT: CPT | Mod: CPTII,S$GLB,, | Performed by: FAMILY MEDICINE

## 2025-01-08 PROCEDURE — 99999 PR PBB SHADOW E&M-EST. PATIENT-LVL III: CPT | Mod: PBBFAC,,, | Performed by: FAMILY MEDICINE

## 2025-01-08 PROCEDURE — 82607 VITAMIN B-12: CPT | Performed by: FAMILY MEDICINE

## 2025-01-08 PROCEDURE — 86140 C-REACTIVE PROTEIN: CPT | Performed by: PODIATRIST

## 2025-01-08 PROCEDURE — 99215 OFFICE O/P EST HI 40 MIN: CPT | Mod: S$GLB,,, | Performed by: FAMILY MEDICINE

## 2025-01-08 PROCEDURE — 84550 ASSAY OF BLOOD/URIC ACID: CPT | Performed by: FAMILY MEDICINE

## 2025-01-08 PROCEDURE — 86803 HEPATITIS C AB TEST: CPT | Performed by: FAMILY MEDICINE

## 2025-01-08 PROCEDURE — 85652 RBC SED RATE AUTOMATED: CPT | Performed by: PODIATRIST

## 2025-01-08 PROCEDURE — 3074F SYST BP LT 130 MM HG: CPT | Mod: CPTII,S$GLB,, | Performed by: FAMILY MEDICINE

## 2025-01-08 PROCEDURE — 81002 URINALYSIS NONAUTO W/O SCOPE: CPT | Mod: S$GLB,,, | Performed by: FAMILY MEDICINE

## 2025-01-08 PROCEDURE — 1160F RVW MEDS BY RX/DR IN RCRD: CPT | Mod: CPTII,S$GLB,, | Performed by: FAMILY MEDICINE

## 2025-01-08 PROCEDURE — 3078F DIAST BP <80 MM HG: CPT | Mod: CPTII,S$GLB,, | Performed by: FAMILY MEDICINE

## 2025-01-08 PROCEDURE — 3288F FALL RISK ASSESSMENT DOCD: CPT | Mod: CPTII,S$GLB,, | Performed by: FAMILY MEDICINE

## 2025-01-08 PROCEDURE — 1158F ADVNC CARE PLAN TLK DOCD: CPT | Mod: CPTII,S$GLB,, | Performed by: FAMILY MEDICINE

## 2025-01-08 PROCEDURE — 1159F MED LIST DOCD IN RCRD: CPT | Mod: CPTII,S$GLB,, | Performed by: FAMILY MEDICINE

## 2025-01-08 PROCEDURE — 3008F BODY MASS INDEX DOCD: CPT | Mod: CPTII,S$GLB,, | Performed by: FAMILY MEDICINE

## 2025-01-08 PROCEDURE — 84443 ASSAY THYROID STIM HORMONE: CPT | Performed by: FAMILY MEDICINE

## 2025-01-08 PROCEDURE — 80061 LIPID PANEL: CPT | Performed by: FAMILY MEDICINE

## 2025-01-08 RX ORDER — BEMPEDOIC ACID 180 MG/1
1 TABLET, FILM COATED ORAL DAILY
Qty: 90 TABLET | Refills: 3 | Status: SHIPPED | OUTPATIENT
Start: 2025-01-08 | End: 2026-01-08

## 2025-01-08 RX ORDER — ALLOPURINOL 300 MG/1
TABLET ORAL
Qty: 30 TABLET | Refills: 0 | Status: SHIPPED | OUTPATIENT
Start: 2025-01-08 | End: 2025-01-09

## 2025-01-08 NOTE — PATIENT INSTRUCTIONS
Discontinue Metformin  Continue other medications as listed on the hand out  F/U in 3 mos.     06-Dec-2023 07:14

## 2025-01-08 NOTE — ASSESSMENT & PLAN NOTE
She was placed on Leviq injection by her cardiolgoist(EMIL), s/p 1 injection, but not planning to take it due to the cost  Trial of Nexletol 180 mg daily.  Recheck level in 3 mos

## 2025-01-08 NOTE — PROGRESS NOTES
Primary Care Provider Appointment   [facility], [place] Day    Patient ID: Ileana Cook is a 64 y.o. female.    ASSESSMENT/PLAN by Problem List:    1. Gouty arthropathy  Assessment & Plan:  Reduced the allopurinol 300 mg -  to 1/2 tab daily  Take Colchicine only if it flares up    Orders:  - URIC ACID; Expected date: 01/08/2025  -     URIC ACID    2. Hyperlipidemia LDL goal <70  Overview:  Component Value Ref Range Test Method Analysis Time Performed At [facility]   Cholesterol, Total 217 (H) 100 - 199 mg/dL     [facility] 1     Triglyceride 217 (H) 0 - 149 mg/dL     [facility] 1     HDL-C 40 >39 mg/dL     [facility] 1     Non HDL Cholesterol 177 (H) 0 - 129 mg/dL     [facility] 1     LDL-C (NIH Calc) 138 (H) 0 - 99 mg/dL     [facility] 1     Comment:                           Optimal               <  100                           Above optimal     100 -  129                           Borderline        130 -  159                           High              160 -  189                           Very high             >  189     Apolipoprotein B 130 (H) <90 mg/dL     [facility] 1     Comment:                          Desirable               < 90                          Borderline High     90 -  99                          High               100 - 130                          Very High               >130      --------------------------------------------------           ASCVD RISK              THERAPEUTIC TARGET            CATEGORY                  APO B (mg/dL)         Very High Risk        <80 (if extreme risk <70)         High Risk             <90         Moderate Risk         <90       Lab Results - (ABNORMAL) Lipid Panel With Apolipoprotein B (ApoB) (07/01/2024 2:36 PM CDT)      Received Time              Assessment & Plan:  She was placed on Leviq injection by her cardiolgoist(EMIL), s/p 1 injection, but not planning to take it due to the cost  Trial of Nexletol 180 mg  daily.  Recheck level in 3 mos      3. Urinary symptom or sign  -     POCT urine dipstick without microscope      Negative for UTI     4. Prediabetes  Overview:  Patient has been on Glucophage for many years, currently on metformin 2 tablets daily.  In addition she is taking compounded to see appetite for weight loss purposes.  Advised her to discontinue Glucophage.      5. Acquired hypothyroidism  -      Expected date: 01/08/2025  -     TSH  Continue dosage unless she is notified if any change in her thyroid function results drawn today.    6. Stage 3a chronic kidney disease  Assessment & Plan:  CMP  Sodium   Date Value Ref Range Status   10/11/2024 137 136 - 145 mmol/L Final     Potassium   Date Value Ref Range Status   10/11/2024 4.9 3.5 - 5.1 mmol/L Final     Chloride   Date Value Ref Range Status   10/11/2024 104 95 - 110 mmol/L Final     CO2   Date Value Ref Range Status   10/11/2024 23 23 - 29 mmol/L Final     Glucose   Date Value Ref Range Status   10/11/2024 85 70 - 110 mg/dL Final     BUN   Date Value Ref Range Status   10/11/2024 22 8 - 23 mg/dL Final     Creatinine   Date Value Ref Range Status   10/11/2024 1.1 0.5 - 1.4 mg/dL Final     Calcium   Date Value Ref Range Status   10/11/2024 9.9 8.7 - 10.5 mg/dL Final     Total Protein   Date Value Ref Range Status   10/11/2024 7.2 6.0 - 8.4 g/dL Final     Albumin   Date Value Ref Range Status   10/11/2024 4.0 3.5 - 5.2 g/dL Final     Total Bilirubin   Date Value Ref Range Status   10/11/2024 0.5 0.1 - 1.0 mg/dL Final     Comment:     For infants and newborns, interpretation of results should be based  on gestational age, weight and in agreement with clinical  observations.    Premature Infant recommended reference ranges:  Up to 24 hours.............<8.0 mg/dL  Up to 48 hours............<12.0 mg/dL  3-5 days..................<15.0 mg/dL  6-29 days.................<15.0 mg/dL       Alkaline Phosphatase   Date Value Ref Range Status   10/11/2024 70 08 - 470  U/L Final     AST   Date Value Ref Range Status   10/11/2024 25 10 - 40 U/L Final     ALT   Date Value Ref Range Status   10/11/2024 20 10 - 44 U/L Final     Anion Gap   Date Value Ref Range Status   10/11/2024 10 8 - 16 mmol/L Final     eGFR   Date Value Ref Range Status   10/11/2024 55.8 (A) >60 mL/min/1.73 m^2 Final    BP is well controlled.  Monitor kidney function periodically      7. Mixed hyperlipidemia  -     Lipid Panel  -     bempedoic acid (NEXLETOL) 180 mg Tab; Take 1 tablet (180 mg total) by mouth once daily.  Dispense: 90 tablet; Refill: 3    8. Immunization counseling  Assessment & Plan:  Patient declines to take all of the preventive immunizations      9. B12 deficiency  -     VITAMIN B12; Future; Expected date: 01/08/2025    10. Need for hepatitis C screening test  -     Hepatitis C Antibody    11. Statin intolerance  -     bempedoic acid (NEXLETOL) 180 mg Tab; Take 1 tablet (180 mg total) by mouth once daily.  Dispense: 90 tablet; Refill: 3           Follow Up:  3 mos    @timebasedbillingstatement@    Health Maintenance         Date Due Completion Date    Hepatitis C Screening Never done ---    Annual UACr Never done ---    HIV Screening Never done ---    TETANUS VACCINE Never done ---    High Dose Statin Never done ---    Shingles Vaccine (1 of 2) Never done ---    Pneumococcal Vaccines (Age 50+) (1 of 1 - PCV) Never done ---    RSV Vaccine (Age 60+ and Pregnant patients) (1 - Risk 60-74 years 1-dose series) Never done ---    Influenza Vaccine (1) 09/01/2024 1/31/2018    COVID-19 Vaccine (1 - 2024-25 season) Never done ---    Hemoglobin A1c (Prediabetes) 10/11/2025 10/11/2024    Mammogram 10/11/2025 10/11/2024    DEXA Scan 10/29/2027 10/29/2024    Lipid Panel 07/01/2029 7/1/2024    Colorectal Cancer Screening 02/28/2033 2/28/2023            Advance Care Planning     Date: 01/08/2025    Power of   I initiated the process of voluntary advance care planning today and explained the importance  of this process to the patient.  I introduced the concept of advance directives to the patient, as well. Then the patient received detailed information about the importance of designating a Health Care Power of  (HCPOA). She was also instructed to communicate with this person about their wishes for future healthcare, should she become sick and lose decision-making capacity. The patient has not previously appointed a HCPOA. After our discussion, the patient has decided to complete a HCPOA and has appointed her daughter, health care agent:  & health care agent number: (see the ACP forms for details_. I encouraged her to communicate with this person about their wishes for future healthcare, should she become sick and lose decision-making capacity.      A total of 8 min was spent on advance care planning, goals of care discussion, emotional support, formulating and communicating prognosis and exploring burden/benefit of various approaches of treatment. This discussion occurred on a fully voluntary basis with the verbal consent of the patient and/or family.             Subjective:     Chief Complaint   Patient presents with    Follow-up    Results     Bone density      I have reviewed the information entered by the ancillary staff regarding the chief complaint as well as the related history.    HPI    Patient is a/an 64 y.o.  female   History of Present Illness    CHIEF COMPLAINT:  Ileana presents today for follow-up of her chronic conditions such as gout, hyperlipidemia, prediabetes, hypothyroidism and to discuss further about her excess weight..  Her blood pressure is well controlled, denies any cardiac symptoms, denies abdominal symptoms.  Compliant with all her medications.  Overall she is feeling well except for low back pain which she had experienced in the past resulting in UTI.  At present she does not have any burning sensation or urinary frequency.  She has mixed hyperlipidemia, and coronary artery  "disease for which she is being followed by cardiologist at the [facility], has had 1 injection of Levique months ago, but she is not planning to continue with the due to it cost.   WEIGHT MANAGEMENT:  She has lost approximately 12 lbs in the last three months, at present, she has decreasing from her highest weight of 279 lbs  over 2 -3 yrs ago  to current weight of 172 lbs. She continues Mounjaro 15 mg for weight loss with a goal weight of 160 lbs.  She has getting this prescription from an external provider who does use compounded trace appetite.  She started the injection approximately 8 months ago, at that time her weight was in the range 220-230 lb range.  She expresses concern about loose skin following her weight loss.    MEDICAL HISTORY:  Her cholesterol is elevated. Colonoscopy from 1-2 years ago at [facility] showed no polyps, though previous colonoscopy identified several polyps.            For complete problem list, past medical history, surgical history, social history, etc., see appropriate section in the electronic medical record    Review of Systems   Constitutional:  Negative for chills, diaphoresis and fever, unintentional wt loss.   Eyes: Negative for eye related complaints  Respiratory:  Negative for cough and shortness of breath.    Cardiovascular:  Negative for chest pain, palpitations and leg swelling.   Gastrointestinal:  Negative for blood in stool, constipation, diarrhea, nausea and vomiting.   Musculoskeletal: Negative for joint and muscle symptoms  Genitourinary:  Negative for dysuria, frequency and hematuria.   Skin:  Negative itching & rashes  Psychiatric/Behavioral:  Denies being anxious and depressed, denies insomnia    Objective     Vitals:    01/08/25 1017   BP: 120/62   BP Location: Left arm   Patient Position: Sitting   Pulse: 79   Temp: 96.7 °F (35.9 °C)   TempSrc: Temporal   SpO2: 98%   Weight: 78.2 kg (172 lb 6.4 oz)   Height: 5' 5" (1.651 m)       PE:  Constitutional:  Not in " acute distress, alert and oriented x3  HENT:  no swollen glands in the neck, no thyromegaly  Eyes: EOMI, conjunctiva within normal limits, PERRLA  Cardiovascular:  Rate and rhythm within normal limits, normal pulses  Pulmonary:  Normal breath sounds, and normal pulmonary efforts w/o wheezing or rales  Abdominal:  Normal bowel sounds, no abdominal pain     Musculoskeletal:  Adequate range of motion of the spine and the joints  Skin:  Skin is warm and dry.   Neurological:  No sensory or motor deficits.   Psychiatric:  Mood and affect within normal limits, behavior appropriate, normal thought process        This note was generated with the assistance of ambient listening technology. Verbal consent was obtained by the patient and accompanying visitor(s) for the recording of patient appointment to facilitate this note. I attest to having reviewed and edited the generated note for accuracy, though some syntax or spelling errors may persist. Please contact the author of this note for any clarification.     THIS DOCUMENT WAS MADE IN PART WITH VOICE RECOGNITION SOFTWARE.  OCCASIONALLY THIS SOFTWARE WILL MISINTERPRET WORDS OR PHRASES.

## 2025-01-09 DIAGNOSIS — M10.9 GOUT INVOLVING TOE OF RIGHT FOOT, UNSPECIFIED CAUSE, UNSPECIFIED CHRONICITY: ICD-10-CM

## 2025-01-09 RX ORDER — ALLOPURINOL 300 MG/1
300 TABLET ORAL DAILY
Qty: 90 TABLET | Refills: 3 | Status: SHIPPED | OUTPATIENT
Start: 2025-01-09 | End: 2026-01-09

## 2025-01-09 RX ORDER — ALLOPURINOL 300 MG/1
300 TABLET ORAL DAILY
Qty: 90 TABLET | Refills: 3 | Status: SHIPPED | OUTPATIENT
Start: 2025-01-09 | End: 2025-01-09

## 2025-01-15 ENCOUNTER — TELEPHONE (OUTPATIENT)
Dept: PRIMARY CARE CLINIC | Facility: CLINIC | Age: 65
End: 2025-01-15
Payer: MEDICARE

## 2025-01-15 DIAGNOSIS — N18.30 CKD (CHRONIC KIDNEY DISEASE) STAGE 3, GFR 30-59 ML/MIN: ICD-10-CM

## 2025-01-15 DIAGNOSIS — M10.9 GOUT INVOLVING TOE OF RIGHT FOOT, UNSPECIFIED CAUSE, UNSPECIFIED CHRONICITY: ICD-10-CM

## 2025-01-15 RX ORDER — ALLOPURINOL 300 MG/1
150 TABLET ORAL DAILY
Qty: 90 TABLET | Refills: 1 | Status: SHIPPED | OUTPATIENT
Start: 2025-01-15 | End: 2026-01-15

## 2025-02-15 ENCOUNTER — PATIENT MESSAGE (OUTPATIENT)
Dept: PRIMARY CARE CLINIC | Facility: CLINIC | Age: 65
End: 2025-02-15
Payer: MEDICARE

## 2025-03-06 ENCOUNTER — PATIENT MESSAGE (OUTPATIENT)
Dept: PRIMARY CARE CLINIC | Facility: CLINIC | Age: 65
End: 2025-03-06
Payer: MEDICARE

## 2025-03-08 DIAGNOSIS — F51.03 PARADOXICAL INSOMNIA: ICD-10-CM

## 2025-03-10 RX ORDER — ZOLPIDEM TARTRATE 10 MG/1
TABLET ORAL
Qty: 90 TABLET | Refills: 0 | Status: SHIPPED | OUTPATIENT
Start: 2025-03-10

## 2025-04-08 ENCOUNTER — OFFICE VISIT (OUTPATIENT)
Dept: PRIMARY CARE CLINIC | Facility: CLINIC | Age: 65
End: 2025-04-08
Payer: MEDICARE

## 2025-04-08 ENCOUNTER — RESEARCH ENCOUNTER (OUTPATIENT)
Dept: RESEARCH | Facility: HOSPITAL | Age: 65
End: 2025-04-08
Payer: MEDICARE

## 2025-04-08 VITALS
HEIGHT: 65 IN | WEIGHT: 170.06 LBS | TEMPERATURE: 97 F | SYSTOLIC BLOOD PRESSURE: 136 MMHG | BODY MASS INDEX: 28.33 KG/M2 | HEART RATE: 77 BPM | DIASTOLIC BLOOD PRESSURE: 74 MMHG | OXYGEN SATURATION: 99 %

## 2025-04-08 DIAGNOSIS — M1A.4710 CHRONIC GOUT DUE TO OTHER SECONDARY CAUSE INVOLVING TOE OF RIGHT FOOT WITHOUT TOPHUS: ICD-10-CM

## 2025-04-08 DIAGNOSIS — E02 SUBCLINICAL IODINE-DEFICIENCY HYPOTHYROIDISM: ICD-10-CM

## 2025-04-08 DIAGNOSIS — E78.5 HYPERLIPIDEMIA LDL GOAL <70: ICD-10-CM

## 2025-04-08 DIAGNOSIS — E66.3 OVERWEIGHT WITH BODY MASS INDEX (BMI) OF 28 TO 28.9 IN ADULT: ICD-10-CM

## 2025-04-08 DIAGNOSIS — F51.03 PARADOXICAL INSOMNIA: ICD-10-CM

## 2025-04-08 DIAGNOSIS — Z00.00 PREVENTATIVE HEALTH CARE: ICD-10-CM

## 2025-04-08 DIAGNOSIS — R73.03 PREDIABETES: Primary | ICD-10-CM

## 2025-04-08 PROBLEM — F51.04 PSYCHOPHYSIOLOGICAL INSOMNIA: Status: ACTIVE | Noted: 2025-04-08

## 2025-04-08 PROCEDURE — 84550 ASSAY OF BLOOD/URIC ACID: CPT | Performed by: FAMILY MEDICINE

## 2025-04-08 PROCEDURE — 80061 LIPID PANEL: CPT | Performed by: FAMILY MEDICINE

## 2025-04-08 PROCEDURE — 36415 COLL VENOUS BLD VENIPUNCTURE: CPT | Mod: PN | Performed by: FAMILY MEDICINE

## 2025-04-08 PROCEDURE — 82043 UR ALBUMIN QUANTITATIVE: CPT | Performed by: FAMILY MEDICINE

## 2025-04-08 PROCEDURE — 80053 COMPREHEN METABOLIC PANEL: CPT | Performed by: FAMILY MEDICINE

## 2025-04-08 RX ORDER — METFORMIN HYDROCHLORIDE 1000 MG/1
1000 TABLET ORAL 2 TIMES DAILY
Qty: 180 TABLET | Refills: 1 | Status: SHIPPED | OUTPATIENT
Start: 2025-04-08 | End: 2025-10-05

## 2025-04-08 RX ORDER — COLCHICINE 0.6 MG/1
0.6 TABLET ORAL 2 TIMES DAILY PRN
Qty: 30 TABLET | Refills: 1 | Status: SHIPPED | OUTPATIENT
Start: 2025-04-08

## 2025-04-08 RX ORDER — ZOLPIDEM TARTRATE 10 MG/1
TABLET ORAL
Qty: 90 TABLET | Refills: 0 | Status: CANCELLED | OUTPATIENT
Start: 2025-04-08

## 2025-04-08 RX ORDER — ZOLPIDEM TARTRATE 10 MG/1
10 TABLET ORAL NIGHTLY PRN
Qty: 90 TABLET | Refills: 1 | Status: SHIPPED | OUTPATIENT
Start: 2025-04-08 | End: 2025-10-07

## 2025-04-08 RX ORDER — METFORMIN HYDROCHLORIDE 1000 MG/1
1000 TABLET ORAL 2 TIMES DAILY
COMMUNITY
Start: 2025-03-08 | End: 2025-04-08 | Stop reason: SDUPTHER

## 2025-04-08 NOTE — PROGRESS NOTES
Study Title: BRENDA: Real-world Evidence to Advance Multi-Cancer Early Detection Health Equity (REACH/Galleri-Medicare study)    Protocol IRB #: [id number]     Sponsor: JUANA    : Kehinde Holland MD    Patient eligibility was checked prior to enrollment in the study. Patient met the following inclusion and exclusion criteria:     INCLUSION CRITERIA  Participant age is 50 years or older with Medicare coverage at the time of signing the Informed Consent form  Participant is eligible to receive the Galleri test, based on a determination by the study investigator or designee  Participant is capable of giving signed Informed Consent that is legally effective (consent provided by LAR is not permitted)  Participant is able to comprehend and respond to questions in participant questionnaires.    EXCLUSION CRITERIA  Participant having had a previous Galleri test not associated with this study  Participant is undergoing or referred for diagnostic evaluation due to clinical suspicion for cancer  Participant has a personal history of invasive or hematologic malignancy, diagnosed within the last 3 years prior to expected enrollment date or diagnosed greater than 3 years prior to expected enrollment date and never treated  Participant has had definitive treatment for invasive or hematologic malignancy within the 3 years prior to expected enrollment date  Participant is not able to comply with protocol procedures  Participant is not a current patient at a participating center  Participant is currently enrolled or was previously enrolled in another [facility]-sponsored study  Participant is current or previous employee/contractor of [employer]  Participant is currently pregnant (by participant's self-report of pregnancy status)    DOCUMENTATION OF INFORMED CONSENT    Prior to the Informed Consent (IC) being signed, or any study protocol required data collection, testing, procedure, or intervention being performed, the  following was done and/or discussed:  Patient was given a copy of the IC for review   Purpose of the study and qualifications to participate   Study design, Follow up schedule, and tests or procedures done at each visit  Confidentiality and HIPAA Authorization for Release of Medical Records for the research trial/ subject's rights/research related injury  Risk, Benefits, Alternative Treatments, Compensation and Costs  Participation in the research trial is voluntary and patient may withdraw at anytime  Contact information for study related questions    Patient verbalizes understanding of the above: Yes  Contact information for CRC and PI given to patient: Yes  Patient able to adequately summarize: the purpose of the study, the risks associated with the study, and all procedures, testing, and follow-ups associated with the study: Yes    Patient signed the informed consent form for the research study with an IRB approval date of July 02,2024. Each page of the consent form was reviewed with patient and all questions answered satisfactorily. Patient received a copy of the consent form. The original consent was scanned into electronic medical records.    INSURANCE VERIFICATION    Thoroughly discussed with patient that the study team does not expect them to be billed for this test. However, by participating in this trial, we cannot guarantee that they will not receive a bill, as cost ultimately depends on the details of their Medicare coverage. Patient voiced understanding.      BLOOD DRAW    Following IC being signed and prior to blood draw, patient completed all baseline/pretest questionnaires. The following specimens were collected from the pt at the time of this encounter via peripheral blood draw.    Blood draw location: Left Arm  Needle used: 21 gauge butterfly needle  Blood draw amount: 20ml  Blood draw time: 11:50AM

## 2025-04-08 NOTE — ASSESSMENT & PLAN NOTE
DEXA scan results were satisfactory.  - Decided against recommending Pap smear due to monogamous relationship status.

## 2025-04-08 NOTE — ASSESSMENT & PLAN NOTE
Currently on compounded Mounjaro for weight loss purposes.  Patient has other chronic conditions such as hypothyroidism hyperlipidemia.  Obtaining healthy weight is appropriate   Reviewed weight loss progress, noting significant improvement from previous maximum of 279 lbs to current 170 lbs with goal of reaching 160-165 lbs.    Started compounded semaglutide (similar to Ozempic) 2.5 mg with B12, to be obtained from local pharmacy on [facility].

## 2025-04-08 NOTE — PATIENT INSTRUCTIONS
Take your routine medications as prescribed and if any side effects occur from any of the given medications, please call us. If you are given any new medications, make sure to read the side effects to be aware of the possible side effects.

## 2025-04-08 NOTE — ASSESSMENT & PLAN NOTE
Currently on bempedoic acid 180 mg.  Lipid profile and CMP drawn today  Copy of the lab work to her cardiologist Dr. Zackery Conroy

## 2025-04-08 NOTE — ASSESSMENT & PLAN NOTE
Continued Ambien, instructed to take full tablet as needed for sleep, with option to try half tablet when possible.

## 2025-04-08 NOTE — ASSESSMENT & PLAN NOTE
In spite of taking allopurinol she has had gout exacerbation requiring colchicine.  Check uric acid level.  Continued allopurinol at current dose for gout prevention.  Discussed potential aggravating factors for gout, including high-protein foods, beer, and shellfish.  - Refilled Colchicine 30 tablets, instructed to take 1 tablet twice daily for 3-4 days during gout flares, then taper off.
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
## 2025-04-09 ENCOUNTER — PATIENT MESSAGE (OUTPATIENT)
Dept: PRIMARY CARE CLINIC | Facility: CLINIC | Age: 65
End: 2025-04-09
Payer: MEDICARE

## 2025-04-09 DIAGNOSIS — Z78.9 STATIN INTOLERANCE: ICD-10-CM

## 2025-04-09 DIAGNOSIS — E78.5 HYPERLIPIDEMIA LDL GOAL <70: Primary | ICD-10-CM

## 2025-04-09 LAB
ALBUMIN SERPL BCP-MCNC: 3.9 G/DL (ref 3.5–5.2)
ALBUMIN/CREAT UR: 45.2 UG/MG
ALP SERPL-CCNC: 45 UNIT/L (ref 40–150)
ALT SERPL W/O P-5'-P-CCNC: 18 UNIT/L (ref 10–44)
ANION GAP (OHS): 8 MMOL/L (ref 8–16)
AST SERPL-CCNC: 23 UNIT/L (ref 11–45)
BILIRUB SERPL-MCNC: 0.3 MG/DL (ref 0.1–1)
BUN SERPL-MCNC: 22 MG/DL (ref 8–23)
CALCIUM SERPL-MCNC: 10 MG/DL (ref 8.7–10.5)
CHLORIDE SERPL-SCNC: 104 MMOL/L (ref 95–110)
CHOLEST SERPL-MCNC: 160 MG/DL (ref 120–199)
CHOLEST/HDLC SERPL: 4.6 {RATIO} (ref 2–5)
CO2 SERPL-SCNC: 28 MMOL/L (ref 23–29)
CREAT SERPL-MCNC: 1.2 MG/DL (ref 0.5–1.4)
CREAT UR-MCNC: 42 MG/DL (ref 15–325)
GFR SERPLBLD CREATININE-BSD FMLA CKD-EPI: 50 ML/MIN/1.73/M2
GLUCOSE SERPL-MCNC: 64 MG/DL (ref 70–110)
HDLC SERPL-MCNC: 35 MG/DL (ref 40–75)
HDLC SERPL: 21.9 % (ref 20–50)
LDLC SERPL CALC-MCNC: 104.2 MG/DL (ref 63–159)
MICROALBUMIN UR-MCNC: 19 UG/ML (ref ?–5000)
NONHDLC SERPL-MCNC: 125 MG/DL
POTASSIUM SERPL-SCNC: 5.5 MMOL/L (ref 3.5–5.1)
PROT SERPL-MCNC: 7.1 GM/DL (ref 6–8.4)
SODIUM SERPL-SCNC: 140 MMOL/L (ref 136–145)
TRIGL SERPL-MCNC: 104 MG/DL (ref 30–150)
URATE SERPL-MCNC: 8.2 MG/DL (ref 2.4–5.7)

## 2025-04-09 NOTE — TELEPHONE ENCOUNTER
Left a message with Ayse that her cholesterol level is doing well, continue bempedoic acid 180 mg once a day, sending the prescription to [facility] to see whether it gets approved.  Uric acid is still high, continue allopurinol 300 mg daily,

## 2025-04-15 DIAGNOSIS — Z78.9 STATIN INTOLERANCE: ICD-10-CM

## 2025-04-15 DIAGNOSIS — E78.5 HYPERLIPIDEMIA LDL GOAL <70: ICD-10-CM

## 2025-04-17 ENCOUNTER — TELEPHONE (OUTPATIENT)
Dept: RESEARCH | Facility: HOSPITAL | Age: 65
End: 2025-04-17
Payer: MEDICARE

## 2025-04-17 NOTE — TELEPHONE ENCOUNTER
Study Title: BRENDA: Real-world Evidence to Advance Multi-Cancer Early Detection Health Equity (BRENDA/[employer]ael-Medicare study)  Protocol IRB #: [id number]  IRB Approval Date: 02 July 2024  Sponsor: JUANA  : Kehinde Holland MD     [facility] ID: [id number]     Results of the [place] Multi Cancer Early Detection test and were reviewed by PI Dr Holland. Patient was called and notified of test results, there was no signal detected.     Patient reminded, this was a screening test, so we still highly encourage them to continue other normal health screenings  and to continue to adhere to guideline-recommended cancer screenings.     Patient was asked about completing follow-up questionnaire online and was agreeable.

## 2025-07-14 ENCOUNTER — PATIENT MESSAGE (OUTPATIENT)
Dept: PRIMARY CARE CLINIC | Facility: CLINIC | Age: 65
End: 2025-07-14
Payer: MEDICARE

## 2025-07-28 ENCOUNTER — OFFICE VISIT (OUTPATIENT)
Dept: PRIMARY CARE CLINIC | Facility: CLINIC | Age: 65
End: 2025-07-28
Payer: MEDICARE

## 2025-07-28 VITALS
TEMPERATURE: 98 F | DIASTOLIC BLOOD PRESSURE: 64 MMHG | RESPIRATION RATE: 20 BRPM | OXYGEN SATURATION: 98 % | HEIGHT: 65 IN | WEIGHT: 172.38 LBS | BODY MASS INDEX: 28.72 KG/M2 | SYSTOLIC BLOOD PRESSURE: 130 MMHG | HEART RATE: 72 BPM

## 2025-07-28 DIAGNOSIS — E78.5 HYPERLIPIDEMIA LDL GOAL <70: ICD-10-CM

## 2025-07-28 DIAGNOSIS — M1A.4710 CHRONIC GOUT DUE TO OTHER SECONDARY CAUSE INVOLVING TOE OF RIGHT FOOT WITHOUT TOPHUS: ICD-10-CM

## 2025-07-28 DIAGNOSIS — N18.31 STAGE 3A CHRONIC KIDNEY DISEASE: Primary | ICD-10-CM

## 2025-07-28 DIAGNOSIS — F51.04 PSYCHOPHYSIOLOGICAL INSOMNIA: ICD-10-CM

## 2025-07-28 DIAGNOSIS — E02 SUBCLINICAL IODINE-DEFICIENCY HYPOTHYROIDISM: ICD-10-CM

## 2025-07-28 DIAGNOSIS — I25.10 ATHEROSCLEROSIS OF NATIVE CORONARY ARTERY OF NATIVE HEART WITHOUT ANGINA PECTORIS: ICD-10-CM

## 2025-07-28 DIAGNOSIS — E66.3 OVERWEIGHT WITH BODY MASS INDEX (BMI) OF 28 TO 28.9 IN ADULT: ICD-10-CM

## 2025-07-28 LAB
ALBUMIN SERPL BCP-MCNC: 4.1 G/DL (ref 3.5–5.2)
ALBUMIN/CREAT UR: 21.8 UG/MG
ALP SERPL-CCNC: 43 UNIT/L (ref 40–150)
ALT SERPL W/O P-5'-P-CCNC: 25 UNIT/L (ref 0–55)
ANION GAP (OHS): 9 MMOL/L (ref 8–16)
AST SERPL-CCNC: 32 UNIT/L (ref 0–50)
BILIRUB SERPL-MCNC: 0.5 MG/DL (ref 0.1–1)
BUN SERPL-MCNC: 29 MG/DL (ref 8–23)
CALCIUM SERPL-MCNC: 9.5 MG/DL (ref 8.7–10.5)
CHLORIDE SERPL-SCNC: 103 MMOL/L (ref 95–110)
CO2 SERPL-SCNC: 26 MMOL/L (ref 23–29)
CREAT SERPL-MCNC: 1.4 MG/DL (ref 0.5–1.4)
CREAT UR-MCNC: 119 MG/DL (ref 15–325)
GFR SERPLBLD CREATININE-BSD FMLA CKD-EPI: 42 ML/MIN/1.73/M2
GLUCOSE SERPL-MCNC: 70 MG/DL (ref 70–110)
MICROALBUMIN UR-MCNC: 26 UG/ML (ref ?–5000)
POTASSIUM SERPL-SCNC: 4.6 MMOL/L (ref 3.5–5.1)
PROT SERPL-MCNC: 7.1 GM/DL (ref 6–8.4)
SODIUM SERPL-SCNC: 138 MMOL/L (ref 136–145)

## 2025-07-28 PROCEDURE — 1159F MED LIST DOCD IN RCRD: CPT | Mod: CPTII,S$GLB,, | Performed by: FAMILY MEDICINE

## 2025-07-28 PROCEDURE — 3008F BODY MASS INDEX DOCD: CPT | Mod: CPTII,S$GLB,, | Performed by: FAMILY MEDICINE

## 2025-07-28 PROCEDURE — 3078F DIAST BP <80 MM HG: CPT | Mod: CPTII,S$GLB,, | Performed by: FAMILY MEDICINE

## 2025-07-28 PROCEDURE — 99215 OFFICE O/P EST HI 40 MIN: CPT | Mod: S$GLB,,, | Performed by: FAMILY MEDICINE

## 2025-07-28 PROCEDURE — 3288F FALL RISK ASSESSMENT DOCD: CPT | Mod: CPTII,S$GLB,, | Performed by: FAMILY MEDICINE

## 2025-07-28 PROCEDURE — 1101F PT FALLS ASSESS-DOCD LE1/YR: CPT | Mod: CPTII,S$GLB,, | Performed by: FAMILY MEDICINE

## 2025-07-28 PROCEDURE — 1126F AMNT PAIN NOTED NONE PRSNT: CPT | Mod: CPTII,S$GLB,, | Performed by: FAMILY MEDICINE

## 2025-07-28 PROCEDURE — 99999 PR PBB SHADOW E&M-EST. PATIENT-LVL IV: CPT | Mod: PBBFAC,,, | Performed by: FAMILY MEDICINE

## 2025-07-28 PROCEDURE — 80053 COMPREHEN METABOLIC PANEL: CPT | Performed by: FAMILY MEDICINE

## 2025-07-28 PROCEDURE — 3075F SYST BP GE 130 - 139MM HG: CPT | Mod: CPTII,S$GLB,, | Performed by: FAMILY MEDICINE

## 2025-07-28 PROCEDURE — 1160F RVW MEDS BY RX/DR IN RCRD: CPT | Mod: CPTII,S$GLB,, | Performed by: FAMILY MEDICINE

## 2025-07-28 PROCEDURE — 1157F ADVNC CARE PLAN IN RCRD: CPT | Mod: CPTII,S$GLB,, | Performed by: FAMILY MEDICINE

## 2025-07-28 PROCEDURE — 82570 ASSAY OF URINE CREATININE: CPT | Performed by: FAMILY MEDICINE

## 2025-07-28 RX ORDER — BEMPEDOIC ACID 180 MG/1
1 TABLET, FILM COATED ORAL DAILY
COMMUNITY

## 2025-07-28 RX ORDER — PHENTERMINE HYDROCHLORIDE 37.5 MG/1
CAPSULE ORAL
Qty: 45 CAPSULE | Refills: 0 | Status: SHIPPED | OUTPATIENT
Start: 2025-07-28

## 2025-07-28 RX ORDER — TOPIRAMATE 50 MG/1
50 TABLET, FILM COATED ORAL EVERY MORNING
Qty: 90 TABLET | Refills: 0 | Status: SHIPPED | OUTPATIENT
Start: 2025-07-28 | End: 2026-07-28

## 2025-07-28 NOTE — ASSESSMENT & PLAN NOTE
Latest Reference Range & Units 04/08/25 11:40   Cholesterol Total 120 - 199 mg/dL 160   HDL 40 - 75 mg/dL 35 (L)   HDL/Cholesterol Ratio 20.0 - 50.0 % 21.9   Non-HDL Cholesterol mg/dL 125   Total Cholesterol/HDL Ratio 2.0 - 5.0  4.6   Triglycerides 30 - 150 mg/dL 104   LDL Cholesterol 63.0 - 159.0 mg/dL 104.2   (L): Data is abnormally low  Continue lipid control using oral medication bempedoic acid 180mg daily

## 2025-07-28 NOTE — PATIENT INSTRUCTIONS
Take your routine medications as prescribed.  If any side effects occur from any of the given medications, please call us at the earliest.     If your current symptoms are getting worse or not better after 4-5 days with the given treatment advice, call to schedule appointment.      If you are given any new medications, make sure to read the side effects to be aware of the possible side effects.     If your current problems and the symptoms get worse, contact our clinic.        If you have scheduled visits with your PCP or with the specialists, please follow up with them as scheduled.       If you are advised that you are needing blood work or other diagnostic studies, please get it done as scheduled. If there is any inconvenience, please call the appropriate department to reschedule it

## 2025-07-28 NOTE — PROGRESS NOTES
Primary Care Provider Appointment   [facility], [place]    Patient ID: Ileana Cook is a 65 y.o. female.    Patient Care Team:  Mariano Hook MD as PCP - General (Family Medicine)  Aleida Chávez, RN as Registered Nurse    1. Stage 3a chronic kidney disease  Assessment & Plan:   Latest Reference Range & Units 10/11/24 10:25 04/08/25 11:40   Creatinine 0.5 - 1.4 mg/dL 1.1 1.2   eGFR >60 mL/min/1.73/m2 55.8 ! 50 (L)   !: Data is abnormal  (L): Data is abnormally low  Stable.    Orders:  -     Comprehensive Metabolic Panel; Future; Expected date: 07/28/2025  -     Microalbumin/Creatinine Ratio, Urine; Future; Expected date: 07/28/2025    2. Overweight with body mass index (BMI) of 28 to 28.9 in adult  Overview:  Currently patient is taking Tirze[etide  15 mg on a weekly basis for weight loss purposes and it is helping her blood sugar as well.  Over the past 4 or 5 months she states she has lost over 30 lb.  Patient is feeling well    Assessment & Plan:  Ayse is scheduled to have abdominoplasty/liposuction scheduled.  DC tirzepatide.  Continue combination of phentermine 37.5 mg half tablet along with the Topamax 50 mg both taken at the same time in the morning, hold off this combination proximally 1 week prior to the surgery.  Start back after she has recovered from surgery and without having any abdominal issues.    Orders:  -     topiramate (TOPAMAX) 50 MG tablet; Take 1 tablet (50 mg total) by mouth every morning.  Dispense: 90 tablet; Refill: 0  -     phentermine 37.5 MG capsule; 1/2 tab in the am with Topamax 50 mg once a day  Dispense: 45 capsule; Refill: 0    3. Hyperlipidemia LDL goal <70  Overview:  Component Value Ref Range Test Method Analysis Time Performed At Pathologist Signature   Cholesterol, Total 217 (H) 100 - 199 mg/dL     [facility] 1     Triglyceride 217 (H) 0 - 149 mg/dL     [facility] 1     HDL-C 40 >39 mg/dL     [facility] 1     Non HDL Cholesterol 177 (H)  0 - 129 mg/dL     [facility] 1     LDL-C (NIH Calc) 138 (H) 0 - 99 mg/dL     [facility] 1     Comment:                           Optimal               <  100                           Above optimal     100 -  129                           Borderline        130 -  159                           High              160 -  189                           Very high             >  189     Apolipoprotein B 130 (H) <90 mg/dL     [facility] 1     Comment:                          Desirable               < 90                          Borderline High     90 -  99                          High               100 - 130                          Very High               >130      --------------------------------------------------           ASCVD RISK              THERAPEUTIC TARGET            CATEGORY                  APO B (mg/dL)         Very High Risk        <80 (if extreme risk <70)         High Risk             <90         Moderate Risk         <90       Lab Results - (ABNORMAL) Lipid Panel With Apolipoprotein B (ApoB) (07/01/2024 2:36 PM CDT)      Received Time              Assessment & Plan:  Lipids are well controlled,  continue current antilipidemic medication Bempedoic [id number]/daily      4. Subclinical iodine-deficiency hypothyroidism  Assessment & Plan:  Stable, continue levothyroxine 50 mcg daily.      5. 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 Atherosclerosis of native coronary artery of native heart without angina pectoris  Overview:  Per cardiology consult  1.  Mild aortic atherosclerosis  2.  Codominant coronary circulation with mild to moderate left anterior  descending and ostial right coronary artery stenoses as detailed.  CAD  RADS 3/ P1      Assessment & Plan:   Latest Reference Range & Units 04/08/25 11:40   Cholesterol Total 120 - 199 mg/dL 160   HDL 40 - 75 mg/dL 35 (L)   HDL/Cholesterol Ratio 20.0 - 50.0 %  21.9   Non-HDL Cholesterol mg/dL 125   Total Cholesterol/HDL Ratio 2.0 - 5.0  4.6   Triglycerides 30 - 150 mg/dL 104   LDL Cholesterol 63.0 - 159.0 mg/dL 104.2   (L): Data is abnormally low  Continue lipid control using oral medication bempedoic acid 180mg daily      7. Psychophysiological insomnia  Assessment & Plan:  Advised her to take Ambien only on as-needed basis.             IMPRESSION/DISCUSSION:  - Discontinued semaglutide due to ineffectiveness at 2.5 mg dose.  - Continued Mounjaro (tirzepatide) 15 mg weekly, noting weight plateau between 168-173 lbs, to be discontinued 1 month before surgery.  - Cholesterol management improving with current medication regimen.  - Evaluated renal function and potassium levels due to previous abnormalities.  - Considered alternative weight management options during surgical recovery period.    PATIENT EDUCATION:  - Explained concept of body's preset size and weight stabilization.  - Discussed potential weight changes after discontinuing GLP-1 medications.  - Provided information on surgical considerations related to medication management.    ACTION ITEMS/LIFESTYLE:  - Discussed potential for weight fluctuation post-op and realistic weight goals.    MEDICATIONS:  - Started phentermine 37.5 mg, take half tablet daily.  - Started topiramate 50 mg daily.  - Recommend discontinuing Mounjaro injections before and after upcoming skin removal surgery.    ORDERS:  - Ordered CMP.  - Ordered urine microalbumin test.    FOLLOW UP:  - Follow up in 3 months, after surgery.              Health Maintenance         Date Due Completion Date    RSV Vaccine (Age 60+ and Pregnant patients) (1 - Risk 60-74 years 1-dose series) Never done ---    Mammogram 10/11/2025 10/11/2024    TETANUS VACCINE 04/08/2026 (Originally 3/17/1978) ---    Shingles Vaccine (1 of 2) 04/08/2026 (Originally 3/17/2009) ---    COVID-19 Vaccine (1 - 2024-25 season) 04/08/2026 (Originally 9/1/2024) ---    Pneumococcal  Vaccines (Age 50+) (1 of 1 - PCV) 04/08/2026 (Originally 3/17/2009) ---    Influenza Vaccine (1) 09/01/2025 1/31/2018    Hemoglobin A1c (Prediabetes) 10/11/2025 10/11/2024    Annual UACr 04/08/2026 4/8/2025    DEXA Scan 10/29/2029 10/29/2024    Lipid Panel 04/08/2030 4/8/2025    Colorectal Cancer Screening 02/28/2033 2/28/2023            Worry score  2    Advance Care Planning     Date: 07/28/2025               Subjective:   I have reviewed the information entered by the ancillary staff regarding the chief complaint as well as the related history.  For complete problem list, past medical history, surgical history, social history, etc., see appropriate section in the electronic medical record    CHIEF COMPLAINT:  Ileana presents today for follow-up Hyperlipidemia, gout, excess weight    WEIGHT MANAGEMENT:  She has been taking Mounjaro 5mg weekly (administered Friday/Saturday) for approximately one year. Her weight has plateaued between 168-173 lbs, currently at 172 lbs. She previously tried semaglutide which was ineffective, possibly due to insufficient strength. She obtains Mounjaro from an outside  Health provider and denies any side effects.    SURGICAL PLANNING:  She is scheduled for skin removal and breast lift surgery on September 8th. She will discontinue Mounjaro from August 8th to September 8th as recommended by her surgeon    CURRENT MEDICATIONS:  She takes allopurinol, cholesterol medication, thyroid medication, and metformin chronically. She takes colchicine as needed for gout flares and sleep medication as needed. She denies taking any anxiety medications.    LABS:  Previous labs showed high potassium and low glucose. Cholesterol was good on 4/8. GFR was 50.        ROS    Constitutional: Denies F/C, unusual wt. loss.    Eyes: Denies eye related complaints   Respiratory: Denies cough, SOB, wheezing.    Cardiovascular: Denies CP, palpitations, LE edema.    Gastrointestinal: Denies Abd pain, N/V, bloody  "stool, C/D   Musculoskeletal: Denies joint and muscle symptoms   Neurological: Denies LOC/ HA /Seizures,  sens & motor changes  Genitourinary: Denies urogenital symptoms.    Skin: Denies skin related complaints   Psych/Behave: Denies anxiety/depression, insomnia, memory loss           Objective     Vitals:    07/28/25 0954   BP: 130/64   Pulse: 72   Resp: 20   Temp: 97.5 °F (36.4 °C)   TempSrc: Temporal   SpO2: 98%   Weight: 78.2 kg (172 lb 6.4 oz)   Height: 5' 5" (1.651 m)       Constitutional: NAD, A&O x 3   HENT: Neg swollen glands in the neck, no thyromegaly   Eyes: EOMI, conjunctiva within normal limits, PERRLA   Cardiovascular: Rate and rhythm within normal limits, normal pulses   Pulmonary: Normal breath sounds, and normal pulmonary efforts w/o wheezing or rales   Abdominal: Normal bowel sounds, no abdominal pain    Musculoskeletal: Adequate range of motion of the spine and the joints   Skin: Skin is warm and dry.    Neurological: No sensory or motor deficits.    Psychiatric: Mood and affect- WNL, appropriate behavior and thought process         Weight: 78.2 kg (172 lb 6.4 oz)    BP Readings from Last 3 Encounters:   07/28/25 130/64   04/08/25 136/74   01/08/25 120/62       Wt Readings from Last 3 Encounters:   07/28/25 78.2 kg (172 lb 6.4 oz)   04/08/25 77.2 kg (170 lb 1.4 oz)   01/08/25 78.2 kg (172 lb 6.4 oz)     Body mass index is 28.69 kg/m².    Physical Exam     Lab Results   Component Value Date    WBC 7.40 10/11/2024    HGB 13.5 10/11/2024    HCT 40.5 10/11/2024     10/11/2024    CHOL 160 04/08/2025    TRIG 104 04/08/2025    HDL 35 (L) 04/08/2025    ALT 18 04/08/2025    AST 23 04/08/2025     04/08/2025    K 5.5 (H) 04/08/2025     04/08/2025    CREATININE 1.2 04/08/2025    BUN 22 04/08/2025    CO2 28 04/08/2025    TSH 1.358 01/08/2025    HGBA1C 4.8 10/11/2024    EGFRNORACEVR 50 (L) 04/08/2025    [id number] 01/08/2025       This note was generated with the assistance of ambient " listening technology. Verbal consent was obtained by the patient and accompanying visitor(s) for the recording of patient appointment to facilitate this note. I attest to having reviewed and edited the generated note for accuracy, though some syntax or spelling errors may persist. Please contact the author of this note for any clarification.     THIS DOCUMENT WAS MADE IN PART WITH VOICE RECOGNITION SOFTWARE.  OCCASIONALLY THIS SOFTWARE WILL MISINTERPRET WORDS OR PHRASES.

## 2025-07-28 NOTE — ASSESSMENT & PLAN NOTE
Wt Readings from Last 3 Encounters:   07/28/25 0954 78.2 kg (172 lb 6.4 oz)   04/08/25 1057 77.2 kg (170 lb 1.4 oz)   01/08/25 1017 78.2 kg (172 lb 6.4 oz)      Ayse is scheduled to have abdominoplasty/liposuction scheduled.  DC tirzepatide.  Continue combination of phentermine 37.5 mg half tablet along with the Topamax 50 mg both taken at the same time in the morning, hold off this combination proximally 1 week prior to the surgery.  Start back after she has recovered from surgery and without having any abdominal issues.

## 2025-08-25 ENCOUNTER — PATIENT OUTREACH (OUTPATIENT)
Dept: ADMINISTRATIVE | Facility: HOSPITAL | Age: 65
End: 2025-08-25
Payer: MEDICARE

## 2025-08-27 RX ORDER — BEMPEDOIC ACID 180 MG/1
1 TABLET, FILM COATED ORAL DAILY
Qty: 90 TABLET | Refills: 3 | Status: SHIPPED | OUTPATIENT
Start: 2025-08-27 | End: 2026-08-27